# Patient Record
Sex: MALE | Race: WHITE | NOT HISPANIC OR LATINO | Employment: FULL TIME | URBAN - METROPOLITAN AREA
[De-identification: names, ages, dates, MRNs, and addresses within clinical notes are randomized per-mention and may not be internally consistent; named-entity substitution may affect disease eponyms.]

---

## 2021-04-21 ENCOUNTER — OFFICE VISIT (OUTPATIENT)
Dept: URGENT CARE | Facility: CLINIC | Age: 36
End: 2021-04-21

## 2021-04-21 VITALS
OXYGEN SATURATION: 96 % | WEIGHT: 280 LBS | RESPIRATION RATE: 18 BRPM | HEIGHT: 74 IN | DIASTOLIC BLOOD PRESSURE: 94 MMHG | BODY MASS INDEX: 35.94 KG/M2 | SYSTOLIC BLOOD PRESSURE: 152 MMHG | HEART RATE: 80 BPM | TEMPERATURE: 98 F

## 2021-04-21 DIAGNOSIS — J02.9 VIRAL PHARYNGITIS: Primary | ICD-10-CM

## 2021-04-21 DIAGNOSIS — J06.9 VIRAL URI WITH COUGH: ICD-10-CM

## 2021-04-21 LAB
CTP QC/QA: YES
MOLECULAR STREP A: NEGATIVE

## 2021-04-21 PROCEDURE — 99203 OFFICE O/P NEW LOW 30 MIN: CPT | Mod: TIER,S$GLB,, | Performed by: NURSE PRACTITIONER

## 2021-04-21 PROCEDURE — 87651 POCT STREP A MOLECULAR: ICD-10-PCS | Mod: QW,TIER,S$GLB, | Performed by: NURSE PRACTITIONER

## 2021-04-21 PROCEDURE — 99203 PR OFFICE/OUTPT VISIT, NEW, LEVL III, 30-44 MIN: ICD-10-PCS | Mod: TIER,S$GLB,, | Performed by: NURSE PRACTITIONER

## 2021-04-21 PROCEDURE — 87651 STREP A DNA AMP PROBE: CPT | Mod: QW,TIER,S$GLB, | Performed by: NURSE PRACTITIONER

## 2021-04-21 RX ORDER — PREDNISONE 20 MG/1
40 TABLET ORAL DAILY
Qty: 10 TABLET | Refills: 0 | Status: SHIPPED | OUTPATIENT
Start: 2021-04-21 | End: 2021-04-26

## 2021-04-21 RX ORDER — FLUTICASONE PROPIONATE 50 MCG
2 SPRAY, SUSPENSION (ML) NASAL DAILY
Qty: 15.8 ML | Refills: 0 | Status: SHIPPED | OUTPATIENT
Start: 2021-04-21

## 2021-04-24 ENCOUNTER — CLINICAL SUPPORT (OUTPATIENT)
Dept: URGENT CARE | Facility: CLINIC | Age: 36
End: 2021-04-24

## 2021-04-24 VITALS
OXYGEN SATURATION: 98 % | RESPIRATION RATE: 18 BRPM | HEIGHT: 74 IN | TEMPERATURE: 98 F | SYSTOLIC BLOOD PRESSURE: 145 MMHG | HEART RATE: 71 BPM | BODY MASS INDEX: 35.94 KG/M2 | WEIGHT: 280 LBS | DIASTOLIC BLOOD PRESSURE: 80 MMHG

## 2021-04-24 DIAGNOSIS — H92.03 OTALGIA OF BOTH EARS: ICD-10-CM

## 2021-04-24 DIAGNOSIS — J32.9 SINUSITIS, UNSPECIFIED CHRONICITY, UNSPECIFIED LOCATION: Primary | ICD-10-CM

## 2021-04-24 DIAGNOSIS — J02.9 SORE THROAT: ICD-10-CM

## 2021-04-24 PROCEDURE — 99214 PR OFFICE/OUTPT VISIT, EST, LEVL IV, 30-39 MIN: ICD-10-PCS | Mod: TIER,S$GLB,, | Performed by: NURSE PRACTITIONER

## 2021-04-24 PROCEDURE — 99214 OFFICE O/P EST MOD 30 MIN: CPT | Mod: TIER,S$GLB,, | Performed by: NURSE PRACTITIONER

## 2021-04-24 RX ORDER — AMOXICILLIN 875 MG/1
875 TABLET, FILM COATED ORAL 2 TIMES DAILY
Qty: 20 TABLET | Refills: 0 | Status: SHIPPED | OUTPATIENT
Start: 2021-04-24 | End: 2021-05-04

## 2022-11-03 ENCOUNTER — OFFICE VISIT (OUTPATIENT)
Dept: URGENT CARE | Facility: CLINIC | Age: 37
End: 2022-11-03

## 2022-11-03 VITALS
OXYGEN SATURATION: 96 % | RESPIRATION RATE: 20 BRPM | TEMPERATURE: 101 F | BODY MASS INDEX: 35.94 KG/M2 | WEIGHT: 280 LBS | DIASTOLIC BLOOD PRESSURE: 79 MMHG | HEIGHT: 74 IN | SYSTOLIC BLOOD PRESSURE: 118 MMHG | HEART RATE: 110 BPM

## 2022-11-03 DIAGNOSIS — J10.1 INFLUENZA A: Primary | ICD-10-CM

## 2022-11-03 DIAGNOSIS — R05.9 COUGH, UNSPECIFIED TYPE: ICD-10-CM

## 2022-11-03 DIAGNOSIS — R50.9 FEVER, UNSPECIFIED FEVER CAUSE: ICD-10-CM

## 2022-11-03 LAB
CTP QC/QA: YES
MOLECULAR STREP A: NEGATIVE
POC MOLECULAR INFLUENZA A AGN: POSITIVE
POC MOLECULAR INFLUENZA B AGN: NEGATIVE
SARS-COV-2 AG RESP QL IA.RAPID: NEGATIVE

## 2022-11-03 PROCEDURE — 87651 POCT STREP A MOLECULAR: ICD-10-PCS | Mod: QW,S$GLB,, | Performed by: NURSE PRACTITIONER

## 2022-11-03 PROCEDURE — 87502 POCT INFLUENZA A/B MOLECULAR: ICD-10-PCS | Mod: QW,S$GLB,, | Performed by: NURSE PRACTITIONER

## 2022-11-03 PROCEDURE — 99203 OFFICE O/P NEW LOW 30 MIN: CPT | Mod: S$GLB,,, | Performed by: NURSE PRACTITIONER

## 2022-11-03 PROCEDURE — 87811 SARS CORONAVIRUS 2 ANTIGEN POCT, MANUAL READ: ICD-10-PCS | Mod: QW,S$GLB,, | Performed by: NURSE PRACTITIONER

## 2022-11-03 PROCEDURE — 99203 PR OFFICE/OUTPT VISIT, NEW, LEVL III, 30-44 MIN: ICD-10-PCS | Mod: S$GLB,,, | Performed by: NURSE PRACTITIONER

## 2022-11-03 PROCEDURE — 87651 STREP A DNA AMP PROBE: CPT | Mod: QW,S$GLB,, | Performed by: NURSE PRACTITIONER

## 2022-11-03 PROCEDURE — 87502 INFLUENZA DNA AMP PROBE: CPT | Mod: QW,S$GLB,, | Performed by: NURSE PRACTITIONER

## 2022-11-03 PROCEDURE — 87811 SARS-COV-2 COVID19 W/OPTIC: CPT | Mod: QW,S$GLB,, | Performed by: NURSE PRACTITIONER

## 2022-11-03 RX ORDER — PROMETHAZINE HYDROCHLORIDE AND DEXTROMETHORPHAN HYDROBROMIDE 6.25; 15 MG/5ML; MG/5ML
5 SYRUP ORAL
Qty: 118 ML | Refills: 0 | Status: SHIPPED | OUTPATIENT
Start: 2022-11-03

## 2022-11-03 RX ORDER — OSELTAMIVIR PHOSPHATE 75 MG/1
75 CAPSULE ORAL 2 TIMES DAILY
Qty: 10 CAPSULE | Refills: 0 | Status: SHIPPED | OUTPATIENT
Start: 2022-11-03 | End: 2022-11-08

## 2022-11-03 NOTE — PROGRESS NOTES
"Subjective:       Patient ID: Robbie Wright is a 37 y.o. male.    Vitals:  height is 6' 2" (1.88 m) and weight is 127 kg (279 lb 15.8 oz). His temperature is 100.8 °F (38.2 °C) (abnormal). His blood pressure is 118/79 and his pulse is 110. His respiration is 20 and oxygen saturation is 96%.     Chief Complaint: Cough    Pt reports that he has gotten the flu a month ago. Pt reports that he had a negative home covid test three days ago. Pt reports that he is having body aches, runny nose, coughing, and diarrhea since Monday . Pt reports that he has been experiencing lower back pain as well. Pt reports taking tylenol.     Sinus Problem  This is a new problem. The current episode started in the past 7 days. The problem is unchanged. His pain is at a severity of 7/10. The pain is mild. Associated symptoms include chills, congestion, coughing, diaphoresis and headaches. Past treatments include acetaminophen. The treatment provided mild relief.     Constitution: Positive for chills, sweating and fatigue.   HENT:  Positive for congestion.    Respiratory:  Positive for cough.    Gastrointestinal:  Positive for diarrhea.   Neurological:  Positive for headaches.     Objective:      Physical Exam      Results for orders placed or performed in visit on 11/03/22   SARS Coronavirus 2 Antigen, POCT Manual Read   Result Value Ref Range    SARS Coronavirus 2 Antigen Negative Negative     Acceptable Yes    POCT Influenza A/B MOLECULAR   Result Value Ref Range    POC Molecular Influenza A Ag Positive (A) Negative, Not Reported    POC Molecular Influenza B Ag Negative Negative, Not Reported     Acceptable Yes    POCT Strep A, Molecular   Result Value Ref Range    Molecular Strep A, POC Negative Negative     Acceptable Yes        Assessment:       1. Influenza A    2. Cough, unspecified type    3. Fever, unspecified fever cause          Plan:       Patient Instructions   OVER THE COUNTER " RECOMMENDATIONS/SUGGESTIONS.    Make sure to stay well hydrated.    Use Nasal Saline to mechanically move any post nasal drip from your eustachian tube or from the back of your throat.    Use warm salt water gargles to ease your throat pain. Warm salt water gargles as needed for sore throat-  1/2 tsp salt to 1 cup warm water, gargle as desired.    Use an antihistamine such as Claritin, Zyrtec or Allegra to dry you out.     Use pseudoephedrine (behind the counter) to decongest. Pseudoephedrine  30 mg up to 240 mg /day. It can raise your blood pressure and give you palpitations.    Use mucinex (guaifenisin) to break up mucous up to 2400mg/day to loosen any mucous.   The mucinex DM pill has a cough suppressant that can be sedating. It can be used at night to stop the tickle at the back of your throat.  You can use Mucinex D (it has guaifenesin and a high dose of pseudoephedrine) in the mornings to help decongest.      Use Afrin in each nare for no longer than 3 days, as it is addictive. It can also dry out your mucous membranes and cause elevated blood pressure. This is especially useful if you are flying.    Use Flonase 1-2 sprays/nostril per day. It is a local acting steroid nasal spray, if you develop a bloody nose, stop using the medication immediately.    Sometimes Nyquil at night is beneficial to help you get some rest, however it is sedating and it does have an antihistamine, and tylenol.    Honey is a natural cough suppressant that can be used.    Tylenol up to 4,000 mg a day is safe for short periods and can be used for body aches, pain, and fever. However in high doses and prolonged use it can cause liver irritation.    Ibuprofen is a non-steroidal anti-inflammatory that can be used for body aches, pain, and fever.However it can also cause stomach irritation if over used.       Follow up next week for evaluation for bronchitis        Influenza A  -     oseltamivir (TAMIFLU) 75 MG capsule; Take 1 capsule (75  mg total) by mouth 2 (two) times daily. for 5 days  Dispense: 10 capsule; Refill: 0  -     promethazine-dextromethorphan (PROMETHAZINE-DM) 6.25-15 mg/5 mL Syrp; Take 5 mLs by mouth every 4 to 6 hours as needed (cough).  Dispense: 118 mL; Refill: 0    Cough, unspecified type  -     SARS Coronavirus 2 Antigen, POCT Manual Read  -     POCT Influenza A/B MOLECULAR  -     POCT Strep A, Molecular    Fever, unspecified fever cause  -     POCT Influenza A/B MOLECULAR  -     POCT Strep A, Molecular

## 2022-11-03 NOTE — PATIENT INSTRUCTIONS
OVER THE COUNTER RECOMMENDATIONS/SUGGESTIONS.    Make sure to stay well hydrated.    Use Nasal Saline to mechanically move any post nasal drip from your eustachian tube or from the back of your throat.    Use warm salt water gargles to ease your throat pain. Warm salt water gargles as needed for sore throat-  1/2 tsp salt to 1 cup warm water, gargle as desired.    Use an antihistamine such as Claritin, Zyrtec or Allegra to dry you out.     Use pseudoephedrine (behind the counter) to decongest. Pseudoephedrine  30 mg up to 240 mg /day. It can raise your blood pressure and give you palpitations.    Use mucinex (guaifenisin) to break up mucous up to 2400mg/day to loosen any mucous.   The mucinex DM pill has a cough suppressant that can be sedating. It can be used at night to stop the tickle at the back of your throat.  You can use Mucinex D (it has guaifenesin and a high dose of pseudoephedrine) in the mornings to help decongest.      Use Afrin in each nare for no longer than 3 days, as it is addictive. It can also dry out your mucous membranes and cause elevated blood pressure. This is especially useful if you are flying.    Use Flonase 1-2 sprays/nostril per day. It is a local acting steroid nasal spray, if you develop a bloody nose, stop using the medication immediately.    Sometimes Nyquil at night is beneficial to help you get some rest, however it is sedating and it does have an antihistamine, and tylenol.    Honey is a natural cough suppressant that can be used.    Tylenol up to 4,000 mg a day is safe for short periods and can be used for body aches, pain, and fever. However in high doses and prolonged use it can cause liver irritation.    Ibuprofen is a non-steroidal anti-inflammatory that can be used for body aches, pain, and fever.However it can also cause stomach irritation if over used.       Follow up next week for evaluation for bronchitis

## 2022-11-14 ENCOUNTER — OCCUPATIONAL HEALTH (OUTPATIENT)
Dept: URGENT CARE | Facility: CLINIC | Age: 37
End: 2022-11-14

## 2022-11-14 DIAGNOSIS — Z00.00 ENCOUNTER FOR PHYSICAL EXAMINATION: Primary | ICD-10-CM

## 2022-11-14 PROCEDURE — 90686 FLU VACCINE (QUAD) GREATER THAN OR EQUAL TO 3YO PRESERVATIVE FREE IM: ICD-10-PCS | Mod: S$GLB,,, | Performed by: NURSE PRACTITIONER

## 2022-11-14 PROCEDURE — 99499 COAST GUARD PHYSICAL: ICD-10-PCS | Mod: S$GLB,,, | Performed by: NURSE PRACTITIONER

## 2022-11-14 PROCEDURE — 90707 MMR VACCINE SQ: ICD-10-PCS | Mod: S$GLB,,, | Performed by: NURSE PRACTITIONER

## 2022-11-14 PROCEDURE — 80305 OOH COLLECTION ONLY DRUG SCREEN: ICD-10-PCS | Mod: S$GLB,,, | Performed by: NURSE PRACTITIONER

## 2022-11-14 PROCEDURE — 80305 DRUG TEST PRSMV DIR OPT OBS: CPT | Mod: S$GLB,,, | Performed by: NURSE PRACTITIONER

## 2022-11-14 PROCEDURE — 90686 IIV4 VACC NO PRSV 0.5 ML IM: CPT | Mod: S$GLB,,, | Performed by: NURSE PRACTITIONER

## 2022-11-14 PROCEDURE — 90714 TD VACCINE GREATER THAN OR EQUAL TO 7YO WITH PRESERVATIVE IM: ICD-10-PCS | Mod: S$GLB,,, | Performed by: NURSE PRACTITIONER

## 2022-11-14 PROCEDURE — 90707 MMR VACCINE SC: CPT | Mod: S$GLB,,, | Performed by: NURSE PRACTITIONER

## 2022-11-14 PROCEDURE — 99499 UNLISTED E&M SERVICE: CPT | Mod: S$GLB,,, | Performed by: NURSE PRACTITIONER

## 2022-11-14 PROCEDURE — 90714 TD VACC NO PRESV 7 YRS+ IM: CPT | Mod: S$GLB,,, | Performed by: NURSE PRACTITIONER

## 2025-03-07 ENCOUNTER — OFFICE VISIT (OUTPATIENT)
Dept: URGENT CARE | Facility: CLINIC | Age: 40
End: 2025-03-07

## 2025-03-07 VITALS
HEART RATE: 65 BPM | HEIGHT: 74 IN | DIASTOLIC BLOOD PRESSURE: 83 MMHG | OXYGEN SATURATION: 97 % | TEMPERATURE: 98 F | WEIGHT: 280 LBS | RESPIRATION RATE: 20 BRPM | SYSTOLIC BLOOD PRESSURE: 124 MMHG | BODY MASS INDEX: 35.94 KG/M2

## 2025-03-07 DIAGNOSIS — M25.531 RIGHT WRIST PAIN: Primary | ICD-10-CM

## 2025-03-07 DIAGNOSIS — R11.0 NAUSEA: ICD-10-CM

## 2025-03-07 LAB
CTP QC/QA: YES
CTP QC/QA: YES
POC MOLECULAR INFLUENZA A AGN: NEGATIVE
POC MOLECULAR INFLUENZA B AGN: NEGATIVE
SARS CORONAVIRUS 2 ANTIGEN: NEGATIVE

## 2025-03-07 RX ORDER — ONDANSETRON HYDROCHLORIDE 8 MG/1
8 TABLET, FILM COATED ORAL EVERY 8 HOURS PRN
Qty: 20 TABLET | Refills: 1 | Status: SHIPPED | OUTPATIENT
Start: 2025-03-07

## 2025-03-07 RX ORDER — ONDANSETRON 8 MG/1
8 TABLET, ORALLY DISINTEGRATING ORAL
Status: COMPLETED | OUTPATIENT
Start: 2025-03-07 | End: 2025-03-07

## 2025-03-07 RX ORDER — MELOXICAM 15 MG/1
15 TABLET ORAL DAILY
Qty: 30 TABLET | Refills: 1 | Status: SHIPPED | OUTPATIENT
Start: 2025-03-07

## 2025-03-07 RX ADMIN — ONDANSETRON 8 MG: 8 TABLET, ORALLY DISINTEGRATING ORAL at 06:03

## 2025-03-07 NOTE — PROGRESS NOTES
"Subjective:      Patient ID: Robbie Wright is a 39 y.o. male.    Vitals:  height is 6' 2" (1.88 m) and weight is 127 kg (279 lb 15.8 oz). His oral temperature is 97.6 °F (36.4 °C). His blood pressure is 124/83 and his pulse is 65. His respiration is 20 and oxygen saturation is 97%.     Chief Complaint: GI Problem    39 year old male c/o concern for food poisoning. Pt states having a extreme headache and nausea that started this morning. Pt  tried to vomit twice to feel better but nothing came out, . Pt denies any abdominal pain, he only has just nausea. Pt took ibuprofen, it helped a little bit, but not much. Pt states sweating, even though he doens't feel hot.   No abdominal pain no diarrhea no blood in stool reported.  He also reports some mid to distal forearm pain.  No trauma.  He has been already as he is right-handed.  It is his right wrist that is affected.  No swelling no personal history of any inflammatory arthritis.                                                                                                                                                                                                                                                                                                                                                                                                                                                                                                                                                                                                                                                   GI Problem  The primary symptoms include fatigue and nausea. Primary symptoms do not include fever, abdominal pain, vomiting, diarrhea, hematemesis or hematochezia. The illness began today.   The illness does not include chills, bloating or back pain.   Right     Constitution: Positive for fatigue. Negative for chills and fever.   Gastrointestinal:  Positive for nausea. Negative " for abdominal pain, vomiting, diarrhea and bright red blood in stool.   Musculoskeletal:  Negative for back pain.      Objective:     Physical Exam  Constitutional: Pt oriented to person, place, and time.  Non-toxic appearance.   Patient does not appear ill. No distress. normal  HENT: No icterus or facial swelling appreciated  Head: Normocephalic and atraumatic.   Nose: No congestion.   Pulmonary/Chest: Effort normal. No stridor. No respiratory distress.   Abdominal: Normal appearance. Abdomen exhibits no distension.   Musculoskeletal:       RUE:  There is tenderness to palpation in the distal 1/3 of the right forearm.  There is no masses palpated no gross bony deformities no overlying skin changes no erythema no bruising: No swelling.   Neurological: no focal deficit. Patient is alert and oriented to person, place, and time.   Skin: Skin is not diaphoretic and not pale. no jaundice  Psychiatric: Patients behavior is normal. Mood, judgment and thought content normal.     Results for orders placed or performed in visit on 03/07/25   SARS Coronavirus 2 Antigen, POCT Manual Read    Collection Time: 03/07/25  6:52 PM   Result Value Ref Range    SARS Coronavirus 2 Antigen Negative Negative, Presumptive Negative     Acceptable Yes    POCT Influenza A/B MOLECULAR    Collection Time: 03/07/25  6:52 PM   Result Value Ref Range    POC Molecular Influenza A Ag Negative Negative    POC Molecular Influenza B Ag Negative Negative     Acceptable Yes        Assessment:     1. Right wrist pain    2. Nausea        Plan:       Right wrist pain  -     Cancel: X-Ray Wrist Complete 3 views Right; Future; Expected date: 03/07/2025-- pt declined xray,.  States [facility]      Nausea  -likely early viral syndrome     SARS Coronavirus 2 Antigen, POCT Manual Read  -     POCT Influenza A/B MOLECULAR  -     ondansetron disintegrating tablet 8 mg    Other orders  -      ondansetron (ZOFRAN) 8 MG tablet; Take 1 tablet (8 mg total) by mouth every 8 (eight) hours as needed for Nausea (or vomiting).  Dispense: 20 tablet; Refill: 1  -     meloxicam (MOBIC) 15 MG tablet; Take 1 tablet (15 mg total) by mouth once daily. Use for shortest amount of time needed.  Take with food to minimize stomach upset.  Dispense: 30 tablet; Refill: 1    Patient Instructions   Your COVID and flu tests were negative.    You can have an early viral syndrome.  Rest and hydration encouraged.    You can take ondansetron/Zofran as needed for nausea and vomiting    If you are tolerating liquids you can advanced to a bland diet.  Avoid greasy or spicy foods   You can take Tylenol or even the meloxicam anti-inflammatory as needed for pain.      You can use the meloxicam as needed for wrist pain/likely tendonitis.      You can  a wrist neutralizing splint or brace over-the-counter.    Follow up closely with your primary care physician and / or an orthopedist if wrist pain persisting    Please seek medical attention in nearest Emergency Department if experiencing any worsening or worrisome symptoms

## 2025-03-07 NOTE — LETTER
March 7, 2025      [facility] [address] [address]  Phone: [phone]  Fax: [phone]       Patient: Robbie Wright   YOB: 1985  Date of Visit: 03/07/2025    To Whom It May Concern:    Fozia Wright  was at [facility] on 03/07/2025. The patient may return to work/school on  3/8/25 as long as symptoms have improved and patient is without fever.. If you have any questions or concerns, or if I can be of further assistance, please do not hesitate to contact me.    Sincerely,    Carole Sawyer, DO

## 2025-03-08 NOTE — PATIENT INSTRUCTIONS
Your COVID and flu tests were negative.    You can have an early viral syndrome.  Rest and hydration encouraged.    You can take ondansetron/Zofran as needed for nausea and vomiting    If you are tolerating liquids you can advanced to a bland diet.  Avoid greasy or spicy foods   You can take Tylenol or even the meloxicam anti-inflammatory as needed for pain.      You can use the meloxicam as needed for wrist pain/likely tendonitis.      You can  a wrist neutralizing splint or brace over-the-counter.    Follow up closely with your primary care physician and / or an orthopedist if wrist pain persisting    Please seek medical attention in nearest Emergency Department if experiencing any worsening or worrisome symptoms